# Patient Record
Sex: MALE | Race: WHITE | NOT HISPANIC OR LATINO
[De-identification: names, ages, dates, MRNs, and addresses within clinical notes are randomized per-mention and may not be internally consistent; named-entity substitution may affect disease eponyms.]

---

## 2017-02-28 ENCOUNTER — APPOINTMENT (OUTPATIENT)
Dept: PULMONOLOGY | Facility: CLINIC | Age: 67
End: 2017-02-28

## 2017-02-28 VITALS
HEART RATE: 76 BPM | SYSTOLIC BLOOD PRESSURE: 123 MMHG | HEIGHT: 65 IN | WEIGHT: 196 LBS | RESPIRATION RATE: 14 BRPM | DIASTOLIC BLOOD PRESSURE: 84 MMHG | BODY MASS INDEX: 32.65 KG/M2 | OXYGEN SATURATION: 95 %

## 2017-03-28 ENCOUNTER — RX RENEWAL (OUTPATIENT)
Age: 67
End: 2017-03-28

## 2017-03-30 ENCOUNTER — RX RENEWAL (OUTPATIENT)
Age: 67
End: 2017-03-30

## 2017-08-29 ENCOUNTER — APPOINTMENT (OUTPATIENT)
Dept: PULMONOLOGY | Facility: CLINIC | Age: 67
End: 2017-08-29
Payer: COMMERCIAL

## 2017-08-29 VITALS
HEIGHT: 65 IN | RESPIRATION RATE: 17 BRPM | DIASTOLIC BLOOD PRESSURE: 76 MMHG | SYSTOLIC BLOOD PRESSURE: 120 MMHG | HEART RATE: 88 BPM | WEIGHT: 200 LBS | BODY MASS INDEX: 33.32 KG/M2 | OXYGEN SATURATION: 98 %

## 2017-08-29 PROCEDURE — 94010 BREATHING CAPACITY TEST: CPT

## 2017-08-29 PROCEDURE — 99214 OFFICE O/P EST MOD 30 MIN: CPT | Mod: 25

## 2017-12-19 ENCOUNTER — RX RENEWAL (OUTPATIENT)
Age: 67
End: 2017-12-19

## 2017-12-28 ENCOUNTER — RX RENEWAL (OUTPATIENT)
Age: 67
End: 2017-12-28

## 2018-02-27 ENCOUNTER — APPOINTMENT (OUTPATIENT)
Dept: PULMONOLOGY | Facility: CLINIC | Age: 68
End: 2018-02-27
Payer: COMMERCIAL

## 2018-02-27 VITALS
OXYGEN SATURATION: 97 % | SYSTOLIC BLOOD PRESSURE: 124 MMHG | WEIGHT: 200 LBS | HEIGHT: 65 IN | BODY MASS INDEX: 33.32 KG/M2 | HEART RATE: 98 BPM | DIASTOLIC BLOOD PRESSURE: 74 MMHG

## 2018-02-27 PROCEDURE — 99214 OFFICE O/P EST MOD 30 MIN: CPT | Mod: 25

## 2018-02-27 PROCEDURE — 94010 BREATHING CAPACITY TEST: CPT

## 2018-07-03 ENCOUNTER — RX RENEWAL (OUTPATIENT)
Age: 68
End: 2018-07-03

## 2018-08-20 ENCOUNTER — RX RENEWAL (OUTPATIENT)
Age: 68
End: 2018-08-20

## 2018-09-04 ENCOUNTER — NON-APPOINTMENT (OUTPATIENT)
Age: 68
End: 2018-09-04

## 2018-09-04 ENCOUNTER — APPOINTMENT (OUTPATIENT)
Dept: PULMONOLOGY | Facility: CLINIC | Age: 68
End: 2018-09-04
Payer: COMMERCIAL

## 2018-09-04 VITALS
HEART RATE: 89 BPM | BODY MASS INDEX: 36.99 KG/M2 | WEIGHT: 222 LBS | OXYGEN SATURATION: 97 % | SYSTOLIC BLOOD PRESSURE: 130 MMHG | HEIGHT: 65 IN | DIASTOLIC BLOOD PRESSURE: 68 MMHG

## 2018-09-04 PROCEDURE — 99214 OFFICE O/P EST MOD 30 MIN: CPT | Mod: 25

## 2018-09-04 PROCEDURE — 94010 BREATHING CAPACITY TEST: CPT

## 2018-09-04 RX ORDER — ZOLPIDEM TARTRATE 10 MG/1
10 TABLET ORAL
Qty: 30 | Refills: 5 | Status: ACTIVE | COMMUNITY
Start: 2018-09-04 | End: 1900-01-01

## 2018-09-05 ENCOUNTER — TRANSCRIPTION ENCOUNTER (OUTPATIENT)
Age: 68
End: 2018-09-05

## 2019-02-18 ENCOUNTER — RX RENEWAL (OUTPATIENT)
Age: 69
End: 2019-02-18

## 2019-03-05 ENCOUNTER — NON-APPOINTMENT (OUTPATIENT)
Age: 69
End: 2019-03-05

## 2019-03-05 ENCOUNTER — APPOINTMENT (OUTPATIENT)
Dept: PULMONOLOGY | Facility: CLINIC | Age: 69
End: 2019-03-05
Payer: COMMERCIAL

## 2019-03-05 VITALS
OXYGEN SATURATION: 98 % | RESPIRATION RATE: 17 BRPM | HEART RATE: 75 BPM | WEIGHT: 220 LBS | HEIGHT: 65 IN | DIASTOLIC BLOOD PRESSURE: 80 MMHG | SYSTOLIC BLOOD PRESSURE: 120 MMHG | BODY MASS INDEX: 36.65 KG/M2

## 2019-03-05 PROCEDURE — 94010 BREATHING CAPACITY TEST: CPT

## 2019-03-05 PROCEDURE — 99214 OFFICE O/P EST MOD 30 MIN: CPT | Mod: 25

## 2019-03-05 RX ORDER — MIRTAZAPINE 15 MG/1
15 TABLET, FILM COATED ORAL
Qty: 30 | Refills: 0 | Status: ACTIVE | COMMUNITY
Start: 2018-11-28

## 2019-03-05 RX ORDER — ROSUVASTATIN CALCIUM 10 MG/1
10 TABLET, FILM COATED ORAL
Qty: 30 | Refills: 0 | Status: ACTIVE | COMMUNITY
Start: 2018-12-15

## 2019-03-05 RX ORDER — VALSARTAN AND HYDROCHLOROTHIAZIDE 160; 25 MG/1; MG/1
160-25 TABLET, FILM COATED ORAL
Qty: 30 | Refills: 0 | Status: ACTIVE | COMMUNITY
Start: 2018-12-24

## 2019-03-05 RX ORDER — ALPRAZOLAM 0.25 MG/1
0.25 TABLET ORAL
Qty: 90 | Refills: 0 | Status: ACTIVE | COMMUNITY
Start: 2019-02-23

## 2019-03-05 RX ORDER — CLONAZEPAM 1 MG/1
1 TABLET ORAL
Qty: 30 | Refills: 0 | Status: ACTIVE | COMMUNITY
Start: 2019-02-04

## 2019-03-05 NOTE — HISTORY OF PRESENT ILLNESS
[FreeTextEntry1] : Mr. Gusman is a 68 year old male presenting to the office today for a follow up visit for asthma, insomnia, and BUCK. His chief complaint is poor sleep\par -He states he is generally doing well. \par -He notes he is doing well with the CPAP and even brings it while traveling. \par -His weight has slightly increased. \par -He denies LE edema\par -He notes LE cramps, noticeable when he does not hydrate enough. \par -He shares he is not exercising enough, he notes he cannot find the time. \par -He denies hoarseness\par -He recently had a URI, now recovered. \par -He notes shortness of breath only upon exertion, denies shortness of breath while supine. \par -He denies any headaches, nausea, vomiting, fever, chills, sweats, chest pain, chest pressure, palpitations, wheezing, diarrhea, constipation, dysphagia, myalgias, dizziness, leg swelling, leg pain, itchy eyes, itchy ears, heartburn, reflux, or sour taste in the mouth.

## 2019-03-05 NOTE — ASSESSMENT
[FreeTextEntry1] : Mr. Gusman is a 68 years old male who has a history of poor sleep (OSAS), mild asthma and obesity, HTN, arthritis, stable from a pulmonary perspective. \par \par problem 1: insomnia (I-STOP renewed)\par - Continue Ambien 10 mg QHS on Weekdays \par - Continue Ambien CR 12.5 mg QHS and Xanax 0.5 mg QHS on Weekends. \par -failed Trazodone/ Silenor/ Belsomra\par \par problem 2: poor sleep hygiene \par -recommended to use sunglasses before bed \par -Good sleep hygiene was encouraged including avoiding watching television an hour before bed, keeping caffeine at a low,  avoiding reading, television, or anything, in bed, no drinking any liquids three hours before bedtime, and only getting into bed when tired and ready for sleep. \par \par problem 3: BUCK\par -continue to use the CPAP machine and tolerating it well (dream wear mask)\par -recommended to use Nozovent when unable to use CPAP\par -Discussed the risks/associations with coronary artery disease, atrial fibrillation, arrhythmia, memory loss, issues with concentration, stroke risk, hypertension, nocturia, chronic reflux/Segovia’s esophagus some but not all inclusive. Treatment options discussed including CPAP/BiPAP machine, oral appliance, ProVent therapy, Oxy-Aid by Respitec, new technologies, or positional sleep. \par \par problem 4: mild reactive airways disease (asthma)\par -no therapy indicated\par -quiet\par -Asthma is  believed to be caused by inherited (genetic) and environmental factor, but its exact cause is unknown. Asthma may be triggered by allergens, lung infections, or irritants in the air. Asthma triggers are different for each person \par \par problem 5: obesity\par - Recommended the book "Bright Spots and Land mines" by Yovani Torres\par -Weight loss, exercise, and diet control were discussed and are highly encouraged. Treatment options were given such as, aqua therapy, and contacting a nutritionist. Recommended to use the elliptical, stationary bike, less use of treadmill.  Obesity is associated with worsening asthma, shortness of breath, and potential for cardiac disease, diabetes, and other underlying medical conditions. \par \par problem 6: psychiatry\par -instructed to follow up with a new psychiatrist for Xanax\par \par problem 7: health maintenance \par -s/p influenza vaccination 2018\par -recommended strep pneumonia vaccines: Prevnar-13 vaccine, followed by Pneumo vaccine 23 one year following\par -recommended early intervention for URIs\par -recommended regular osteoporosis evaluations\par -recommended early dermatological evaluations\par -recommended after the age of 50 to the age of 70, colonoscopy every 5 years \par  \par \par F/U in 6 months\par He is encouraged to call with any changes, concerns, or questions.

## 2019-03-05 NOTE — PROCEDURE
[FreeTextEntry1] : PFT- spi reveals normal flows; FEV1 was 2.56 L which is 94% of predicted; normal flow volume loop\par \par 6 minute walk test reveals a low saturation of 98% with no evidence of dyspnea or fatigue; walked 586.8 meters\par  \par \par

## 2019-03-05 NOTE — ADDENDUM
[FreeTextEntry1] : Documented by Teri Sutton acting as a scribe for Dr. Flavio De La Cruz on 3/5/2019.\par \par All medical record entries made by the scribe, Teri Sutton, were at my, Dr. Flavio De La Cruz's, direction and personally dictated by me on 3/5/2019. I have reviewed the chart and agree that the record accurately reflects my personal performance of the history, physical exam, assessment and plan. I have also personally directed, reviewed, and agree with the discharge instructions.\par \par \par \par \par \par

## 2019-04-17 ENCOUNTER — RX RENEWAL (OUTPATIENT)
Age: 69
End: 2019-04-17

## 2019-04-18 ENCOUNTER — RX RENEWAL (OUTPATIENT)
Age: 69
End: 2019-04-18

## 2019-08-12 ENCOUNTER — RX RENEWAL (OUTPATIENT)
Age: 69
End: 2019-08-12

## 2019-08-12 RX ORDER — ZOLPIDEM TARTRATE 10 MG/1
10 TABLET ORAL
Qty: 30 | Refills: 5 | Status: ACTIVE | COMMUNITY
Start: 2019-02-18 | End: 1900-01-01

## 2019-09-10 ENCOUNTER — APPOINTMENT (OUTPATIENT)
Dept: PULMONOLOGY | Facility: CLINIC | Age: 69
End: 2019-09-10
Payer: COMMERCIAL

## 2019-09-10 ENCOUNTER — NON-APPOINTMENT (OUTPATIENT)
Age: 69
End: 2019-09-10

## 2019-09-10 VITALS
RESPIRATION RATE: 16 BRPM | HEIGHT: 65 IN | BODY MASS INDEX: 35.49 KG/M2 | DIASTOLIC BLOOD PRESSURE: 60 MMHG | HEART RATE: 75 BPM | WEIGHT: 213 LBS | SYSTOLIC BLOOD PRESSURE: 100 MMHG | OXYGEN SATURATION: 97 %

## 2019-09-10 PROCEDURE — 99214 OFFICE O/P EST MOD 30 MIN: CPT | Mod: 25

## 2019-09-10 PROCEDURE — 94010 BREATHING CAPACITY TEST: CPT

## 2019-09-10 NOTE — PROCEDURE
[FreeTextEntry1] : PFT- spi reveals normal flows; FEV1 was 2.88 L which is 106% of predicted; normal flow volume loop\par \par \par

## 2019-09-10 NOTE — ADDENDUM
[FreeTextEntry1] : All medical record entries made by ervin Mead were at Dr. Flavio De La Cruz's, direction and personally dictated by me on 09/10/2019. I have reviewed the chart and agree that the record accurately reflects my personal performance of the history, physical exam, assessment and plan. I have also personally directed, reviewed, and agree with the discharge instructions. \par \par \par \par \par

## 2019-09-10 NOTE — PHYSICAL EXAM
[General Appearance - Well Developed] : well developed [Well Groomed] : well groomed [Normal Appearance] : normal appearance [No Deformities] : no deformities [General Appearance - Well Nourished] : well nourished [General Appearance - In No Acute Distress] : no acute distress [Normal Conjunctiva] : the conjunctiva exhibited no abnormalities [Eyelids - No Xanthelasma] : the eyelids demonstrated no xanthelasmas [Normal Oropharynx] : normal oropharynx [III] : III [Neck Appearance] : the appearance of the neck was normal [Neck Cervical Mass (___cm)] : no neck mass was observed [Jugular Venous Distention Increased] : there was no jugular-venous distention [Thyroid Diffuse Enlargement] : the thyroid was not enlarged [Thyroid Nodule] : there were no palpable thyroid nodules [Heart Sounds] : normal S1 and S2 [Heart Rate And Rhythm] : heart rate and rhythm were normal [Murmurs] : no murmurs present [Respiration, Rhythm And Depth] : normal respiratory rhythm and effort [Auscultation Breath Sounds / Voice Sounds] : lungs were clear to auscultation bilaterally [Exaggerated Use Of Accessory Muscles For Inspiration] : no accessory muscle use [Abdomen Soft] : soft [Abdomen Tenderness] : non-tender [Abnormal Walk] : normal gait [Abdomen Mass (___ Cm)] : no abdominal mass palpated [Gait - Sufficient For Exercise Testing] : the gait was sufficient for exercise testing [Nail Clubbing] : no clubbing of the fingernails [Cyanosis, Localized] : no localized cyanosis [Petechial Hemorrhages (___cm)] : no petechial hemorrhages [] : no rash [Skin Color & Pigmentation] : normal skin color and pigmentation [No Venous Stasis] : no venous stasis [No Skin Ulcers] : no skin ulcer [Skin Lesions] : no skin lesions [No Xanthoma] : no  xanthoma was observed [Sensation] : the sensory exam was normal to light touch and pinprick [Deep Tendon Reflexes (DTR)] : deep tendon reflexes were 2+ and symmetric [No Focal Deficits] : no focal deficits [Oriented To Time, Place, And Person] : oriented to person, place, and time [Affect] : the affect was normal [Impaired Insight] : insight and judgment were intact [FreeTextEntry1] : I:E 1:3, clear

## 2019-09-10 NOTE — HISTORY OF PRESENT ILLNESS
[FreeTextEntry1] : Mr. Gusman is a 68 year old male presenting to the office today for a follow up visit for asthma, insomnia, and BUCK. His chief complaint is lack of exercise \par \par -Takes Xanax in order to sleep. Sleeps 5-6 hours. \par -Nocturia recently\par -Energy level is high.\par -Not exercising much, playing with grandkids. \par -Weight is stable lower than last time. \par -Vision, smell and taste are normal. \par \par -He denies any SOB, headaches, nausea, vomiting, fever, chills, sweats, chest pain, chest pressure, palpitations, wheezing, diarrhea, constipation, dysphagia, myalgias, dizziness, leg swelling, leg pain, itchy eyes, itchy ears, heartburn, reflux, or sour taste in the mouth.

## 2019-09-10 NOTE — ASSESSMENT
[FreeTextEntry1] : Mr. Gusman is a 68 years old male who has a history of poor sleep (OSAS), mild asthma and obesity, HTN, arthritis, stable from a pulmonary perspective. -#1 issue is lack of exercise\par \par problem 1: insomnia (I-STOP renewed)\par - Continue Ambien 10 mg QHS on Weekdays \par - Continue Ambien CR 12.5 mg QHS and Xanax 0.5 mg QHS on Weekends. \par -failed Trazodone/ Silenor/ Belsomra\par \par problem 2: poor sleep hygiene \par -recommended to use sunglasses before bed \par -Good sleep hygiene was encouraged including avoiding watching television an hour before bed, keeping caffeine at a low,  avoiding reading, television, or anything, in bed, no drinking any liquids three hours before bedtime, and only getting into bed when tired and ready for sleep. \par \par problem 3: BUCK\par -continue to use the CPAP machine and tolerating it well (dream wear mask)\par -recommended to use Nozovent when unable to use CPAP\par -Discussed the risks/associations with coronary artery disease, atrial fibrillation, arrhythmia, memory loss, issues with concentration, stroke risk, hypertension, nocturia, chronic reflux/Segovia’s esophagus some but not all inclusive. Treatment options discussed including CPAP/BiPAP machine, oral appliance, ProVent therapy, Oxy-Aid by Respitec, new technologies, or positional sleep. \par \par problem 4: mild reactive airways disease (asthma)\par -no therapy indicated\par -quiet\par -Asthma is  believed to be caused by inherited (genetic) and environmental factor, but its exact cause is unknown. Asthma may be triggered by allergens, lung infections, or irritants in the air. Asthma triggers are different for each person \par \par problem 5: obesity\par - Recommended the book "Bright Spots and Land mines" by Yovani Torres\par -Weight loss, exercise, and diet control were discussed and are highly encouraged. Treatment options were given such as, aqua therapy, and contacting a nutritionist. Recommended to use the elliptical, stationary bike, less use of treadmill.  Obesity is associated with worsening asthma, shortness of breath, and potential for cardiac disease, diabetes, and other underlying medical conditions. \par \par problem 6: psychiatry\par -instructed to follow up with a new psychiatrist for Xanax\par \par problem 7: health maintenance \par -s/p influenza vaccination 2018\par -recommended strep pneumonia vaccines: Prevnar-13 vaccine, followed by Pneumo vaccine 23 one year following\par -recommended early intervention for URIs\par -recommended regular osteoporosis evaluations\par -recommended early dermatological evaluations\par -recommended after the age of 50 to the age of 70, colonoscopy every 5 years \par  \par F/U in 6 months SPI\par He is encouraged to call with any changes, concerns, or questions.

## 2020-02-18 ENCOUNTER — RX RENEWAL (OUTPATIENT)
Age: 70
End: 2020-02-18

## 2020-03-10 ENCOUNTER — APPOINTMENT (OUTPATIENT)
Dept: PULMONOLOGY | Facility: CLINIC | Age: 70
End: 2020-03-10
Payer: COMMERCIAL

## 2020-06-06 ENCOUNTER — TRANSCRIPTION ENCOUNTER (OUTPATIENT)
Age: 70
End: 2020-06-06

## 2020-06-09 ENCOUNTER — APPOINTMENT (OUTPATIENT)
Dept: PULMONOLOGY | Facility: CLINIC | Age: 70
End: 2020-06-09
Payer: COMMERCIAL

## 2020-06-09 DIAGNOSIS — Z11.59 ENCOUNTER FOR SCREENING FOR OTHER VIRAL DISEASES: ICD-10-CM

## 2020-06-09 PROCEDURE — 99214 OFFICE O/P EST MOD 30 MIN: CPT | Mod: 95

## 2020-06-09 RX ORDER — DOXEPIN HYDROCHLORIDE 3 MG/1
3 TABLET ORAL
Qty: 30 | Refills: 5 | Status: DISCONTINUED | COMMUNITY
Start: 2018-02-27 | End: 2020-06-09

## 2020-06-09 RX ORDER — TRAZODONE HYDROCHLORIDE 50 MG/1
50 TABLET ORAL
Qty: 30 | Refills: 5 | Status: DISCONTINUED | COMMUNITY
Start: 2017-08-29 | End: 2020-06-09

## 2020-06-09 NOTE — HISTORY OF PRESENT ILLNESS
[Home] : at home, [unfilled] , at the time of the visit. [Medical Office: ([facility])___] : at the medical office located in  [Verbal consent obtained from patient] : the patient, [unfilled] [FreeTextEntry1] : Mr. Gusman is a 69 year old male presenting to the office today via video call for a follow up visit for asthma, insomnia, and BUCK. \par - He is working a lot, but enjoys being at home \par - He is planning on going to [employer] for a month \par - His sleep is okay \par - His ambien is losing some of its effect \par - He wakes up to use the rest room once\par - He lost 10 lbs \par - No coughing or wheezing \par - No palpitations \par - His energy level is good \par - He took a sleep test with Dr. Bhat but hasn’t gotten results back \par \par -he denies any headaches, nausea, vomiting, fever, chills, sweats, chest pain, chest pressure, diarrhea, constipation, dysphagia, dizziness, sour taste in the mouth, leg swelling, leg pain, itchy eyes, itchy ears, heartburn, reflux, myalgias or arthralgias.\par

## 2020-06-09 NOTE — ASSESSMENT
[FreeTextEntry1] : Mr. Gusman is a 69 years old male who has a history of poor sleep (OSAS), prediabetes, mild asthma and obesity, HTN, arthritis, stable from a pulmonary perspective. -#1 issue is lack of exercise and poor sleep. \par \par problem 1: insomnia (I-STOP renewed)\par - Continue Ambien 10 mg QHS on Weekdays \par - Continue Ambien CR 12.5 mg QHS and Xanax 0.5 mg QHS on Weekends. \par -failed Trazodone/ Silenor/ Belsomra\par \par problem 2: poor sleep hygiene \par -recommended to use sunglasses before bed \par -Good sleep hygiene was encouraged including avoiding watching television an hour before bed, keeping caffeine at a low,  avoiding reading, television, or anything, in bed, no drinking any liquids three hours before bedtime, and only getting into bed when tired and ready for sleep. \par \par problem 3: BUCK\par -continue to use the CPAP machine and tolerating it well (dream wear mask)\par - Restudy done by Israel Ponce\par -recommended to use Nozovent when unable to use CPAP\par -Discussed the risks/associations with coronary artery disease, atrial fibrillation, arrhythmia, memory loss, issues with concentration, stroke risk, hypertension, nocturia, chronic reflux/Segovia’s esophagus some but not all inclusive. Treatment options discussed including CPAP/BiPAP machine, oral appliance, ProVent therapy, Oxy-Aid by Respitec, new technologies, or positional sleep. \par \par problem 4: mild reactive airways disease (asthma)\par -no therapy indicated\par -quiet\par -Asthma is  believed to be caused by inherited (genetic) and environmental factor, but its exact cause is unknown. Asthma may be triggered by allergens, lung infections, or irritants in the air. Asthma triggers are different for each person \par \par problem 5: obesity\par - Recommended the book "Bright Spots and Land mines" by Yovani Torres\par -Weight loss, exercise, and diet control were discussed and are highly encouraged. Treatment options were given such as, aqua therapy, and contacting a nutritionist. Recommended to use the elliptical, stationary bike, less use of treadmill.  Obesity is associated with worsening asthma, shortness of breath, and potential for cardiac disease, diabetes, and other underlying medical conditions. \par \par problem 6: psychiatry\par -instructed to follow up with a new psychiatrist for Xanax\par \par Problem 7a: COVID-19 precautionary Immune Support Recommendations:\par -OTC Vitamin C 500mg BID \par -OTC Quercetin 250-500mg BID \par -OTC Zinc 75-100mg per day \par -OTC Melatonin 1 or 2mg a night \par -OTC Vitamin D 1-4000mg per day \par -OTC Tonic Water 8oz per day\par -Water 0.5-1 gallon per day\par \par problem 7: health maintenance \par -s/p influenza vaccination 2019\par -recommended strep pneumonia vaccines: Prevnar-13 vaccine, followed by Pneumo vaccine 23 one year following\par -recommended early intervention for URIs\par -recommended regular osteoporosis evaluations\par -recommended early dermatological evaluations\par -recommended after the age of 50 to the age of 70, colonoscopy every 5 years \par  \par F/U in 6 months SPI\par He is encouraged to call with any changes, concerns, or questions.

## 2020-10-23 ENCOUNTER — RX RENEWAL (OUTPATIENT)
Age: 70
End: 2020-10-23

## 2020-10-26 ENCOUNTER — RX RENEWAL (OUTPATIENT)
Age: 70
End: 2020-10-26

## 2020-10-26 RX ORDER — ZOLPIDEM TARTRATE 10 MG/1
10 TABLET ORAL
Qty: 30 | Refills: 5 | Status: ACTIVE | COMMUNITY
Start: 2019-09-10 | End: 1900-01-01

## 2020-10-27 ENCOUNTER — APPOINTMENT (OUTPATIENT)
Dept: PULMONOLOGY | Facility: CLINIC | Age: 70
End: 2020-10-27
Payer: COMMERCIAL

## 2020-10-27 VITALS
TEMPERATURE: 97.6 F | BODY MASS INDEX: 33.82 KG/M2 | RESPIRATION RATE: 16 BRPM | DIASTOLIC BLOOD PRESSURE: 65 MMHG | OXYGEN SATURATION: 98 % | HEIGHT: 65 IN | WEIGHT: 203 LBS | SYSTOLIC BLOOD PRESSURE: 120 MMHG | HEART RATE: 66 BPM

## 2020-10-27 PROCEDURE — 99072 ADDL SUPL MATRL&STAF TM PHE: CPT

## 2020-10-27 PROCEDURE — 99214 OFFICE O/P EST MOD 30 MIN: CPT

## 2020-10-27 RX ORDER — METFORMIN ER 500 MG 500 MG/1
500 TABLET ORAL
Qty: 30 | Refills: 0 | Status: ACTIVE | COMMUNITY
Start: 2020-05-07

## 2020-10-27 RX ORDER — TRIAMCINOLONE ACETONIDE 1 MG/G
0.1 CREAM TOPICAL
Qty: 80 | Refills: 0 | Status: ACTIVE | COMMUNITY
Start: 2020-10-08

## 2020-10-27 NOTE — HISTORY OF PRESENT ILLNESS
[FreeTextEntry1] : Mr. Gusman is a 70 year old male presenting to the office today for a follow up visit for asthma, insomnia, and BUCK. His chief compliant is weight\par \par *forms filled out for DMV\par -he notes generally feeling well\par -he notes sleep improved with Medical marijuana\par -he denies loss of consciousness episodes\par -he notes returning to exercise cycling\par -he notes weight intentionally decreased\par -he notes diet improved\par - He  notes nightly compliance with CPAP, tolerating well, and benefitting from therapy \par \par -denies any chest pain, chest pressure, diarrhea, constipation, dysphagia, dizziness, leg swelling, leg pain, myalgias, arthralgias, itchy eyes, itchy ears, heartburn, reflux, or sour taste in the mouth.\par \par

## 2020-10-27 NOTE — PROCEDURE
[FreeTextEntry1] : Sleep study (7.6.2020) revealed sleep apnea with an AHI/SHARA of 3.4 and a low oxygen saturation of 86%\par \par

## 2020-10-27 NOTE — ASSESSMENT
[FreeTextEntry1] : Mr. Gusman is a 70 years old male who has a history of poor sleep (OSAS), prediabetes, mild asthma and obesity, HTN, arthritis, stable from a pulmonary perspective. -#1 issue is lack of exercise and #2 obesity. \par ****forms filled out for DMV*****\par \par problem 1: insomnia (I-STOP renewed)\par - Continue Ambien 10 mg QHS on Weekdays \par - Continue Ambien CR 12.5 mg QHS and Xanax 0.5 mg QHS on Weekends. \par -failed Trazodone/ Silenor/ Belsomra\par \par Problem 1A: DMV forms \par -filled out\par \par problem 2: poor sleep hygiene \par -recommended to use sunglasses before bed \par -Good sleep hygiene was encouraged including avoiding watching television an hour before bed, keeping caffeine at a low,  avoiding reading, television, or anything, in bed, no drinking any liquids three hours before bedtime, and only getting into bed when tired and ready for sleep. \par \par problem 3: BUCK\par -continue to use the CPAP machine and tolerating it well (dream wear mask) ([employer]) \par - Restudy done by Israel Ponce\par -recommended to use Nozovent when unable to use CPAP\par -Discussed the risks/associations with coronary artery disease, atrial fibrillation, arrhythmia, memory loss, issues with concentration, stroke risk, hypertension, nocturia, chronic reflux/Segovia’s esophagus some but not all inclusive. Treatment options discussed including CPAP/BiPAP machine, oral appliance, ProVent therapy, Oxy-Aid by Respitec, new technologies, or positional sleep. \par \par problem 4: mild reactive airways disease (asthma)\par -no therapy indicated\par -quiet\par -Asthma is  believed to be caused by inherited (genetic) and environmental factor, but its exact cause is unknown. Asthma may be triggered by allergens, lung infections, or irritants in the air. Asthma triggers are different for each person \par \par problem 5: obesity\par -complete Functional Medicine Evaluation with Dr. Walker and Dr. Wells\par - Recommended the book "Bright Spots and Land mines" by Yovani Torres\par -Weight loss, exercise, and diet control were discussed and are highly encouraged. Treatment options were given such as, aqua therapy, and contacting a nutritionist. Recommended to use the elliptical, stationary bike, less use of treadmill.  Obesity is associated with worsening asthma, shortness of breath, and potential for cardiac disease, diabetes, and other underlying medical conditions. \par \par problem 6: psychiatry\par -instructed to follow up with a new psychiatrist for Xanax\par \par Problem 7a: COVID-19 precautionary Immune Support Recommendations:\par -OTC Vitamin C 500mg BID \par -OTC Quercetin 250-500mg BID \par -OTC Zinc 75-100mg per day \par -OTC Melatonin 1 or 2mg a night \par -OTC Vitamin D 1-4000mg per day \par -OTC Tonic Water 8oz per day\par -Water 0.5-1 gallon per day\par \par problem 7: health maintenance \par -s/p influenza vaccination 2020\par -recommended strep pneumonia vaccines: Prevnar-13 vaccine, followed by Pneumo vaccine 23 one year following (completed) \par -recommended early intervention for URIs\par -recommended regular osteoporosis evaluations\par -recommended early dermatological evaluations\par -recommended after the age of 50 to the age of 70, colonoscopy every 5 years \par  \par F/U in 6 months SPI\par He is encouraged to call with any changes, concerns, or questions.

## 2020-10-27 NOTE — ADDENDUM
[FreeTextEntry1] : \par  Documented by Amos Mcrae acting as a scribe for Dr. Flavio De La Cruz on 10/27/2020.\par \par All medical record entries made by the Scribe were at my, Dr. Flavio De La Cruz's, direction and personally dictated by me on 10/27/2020 . I have reviewed the chart and agree that the record accurately reflects my personal performance of the history, physical exam, assessment and plan. I have also personally directed, reviewed, and agree with the discharge instructions. \par

## 2021-06-07 ENCOUNTER — TRANSCRIPTION ENCOUNTER (OUTPATIENT)
Age: 71
End: 2021-06-07

## 2021-06-08 ENCOUNTER — APPOINTMENT (OUTPATIENT)
Dept: PULMONOLOGY | Facility: CLINIC | Age: 71
End: 2021-06-08
Payer: COMMERCIAL

## 2021-06-08 VITALS
DIASTOLIC BLOOD PRESSURE: 70 MMHG | SYSTOLIC BLOOD PRESSURE: 120 MMHG | BODY MASS INDEX: 36.14 KG/M2 | OXYGEN SATURATION: 95 % | TEMPERATURE: 97.4 F | HEART RATE: 72 BPM | HEIGHT: 63 IN | WEIGHT: 204 LBS | RESPIRATION RATE: 16 BRPM

## 2021-06-08 PROCEDURE — 99214 OFFICE O/P EST MOD 30 MIN: CPT | Mod: 25

## 2021-06-08 PROCEDURE — 94729 DIFFUSING CAPACITY: CPT

## 2021-06-08 PROCEDURE — 99072 ADDL SUPL MATRL&STAF TM PHE: CPT

## 2021-06-08 PROCEDURE — 94727 GAS DIL/WSHOT DETER LNG VOL: CPT

## 2021-06-08 PROCEDURE — 94010 BREATHING CAPACITY TEST: CPT

## 2021-06-08 PROCEDURE — 95012 NITRIC OXIDE EXP GAS DETER: CPT

## 2021-06-08 PROCEDURE — ZZZZZ: CPT

## 2021-06-08 NOTE — REVIEW OF SYSTEMS
[Negative] : Endocrine [Arthralgias] : arthralgias [Dyspnea] : no dyspnea [SOB on Exertion] : no sob on exertion [Chest Discomfort] : no chest discomfort [GERD] : no gerd [Diarrhea] : no diarrhea [Constipation] : no constipation [Dysphagia] : no dysphagia

## 2021-06-08 NOTE — ADDENDUM
[FreeTextEntry1] : Documented by Wes Lazcano acting as a scribe for Dr. Flavio De La Cruz on 06/08/2021.\par \par All medical record entries made by the Scribe were at my, Dr. Flavio De La Cruz's, direction and personally dictated by me on 06/08/2021. I have reviewed the chart and agree that the record accurately reflects my personal performance of the history, physical exam, assessment and plan. I have also personally directed, reviewed, and agree with the discharge instructions.

## 2021-06-08 NOTE — PROCEDURE
[FreeTextEntry1] : Full PFT revealed normal flows, with a FEV1 of 2.92L, which is 121% of predicted, normal lung volumes, and a normal diffusion of 23.8, which is 115% of predicted, with a normal flow volume loop \par \par FENO was 57; a normal value being less than 25\par Fractional exhaled nitric oxide (FENO) is regarded as a simple, noninvasive method for assessing eosinophilic airway inflammation. Produced by a variety of cells within the lung, nitric oxide (NO) concentrations are generally low in healthy individuals. However, high concentrations of NO appear to be involved in nonspecific host defense mechanisms and chronic inflammatory diseases such as asthma. The American Thoracic Society (ATS) therefore has recommended using FENO to aid in the diagnosis and monitoring of eosinophilic airway inflammation and asthma, and for identifying steroid responsive individuals whose chronic respiratory symptoms may be caused by airway inflammation. \par

## 2021-06-08 NOTE — HISTORY OF PRESENT ILLNESS
[FreeTextEntry1] : Mr. Gusman is a 70 year old male presenting to the office today for a follow up visit for asthma, insomnia, and BUCK. His chief compliant is neck pains\par -he reports feeling generally well\par -he states he feels greatly improved since working at home (no commute)\par -he states he is exercising at home, but not enough\par -he reports he has occasional headaches from neck positioning\par -he notes his weight is stable\par -he reports he has an exercise bike at home\par -he states he has a Respironics CPAP machine, and needs to get a Respironics mask as well (instead of Resmed)\par -he states he has a pinched nerve and arthritis in his back, left leg\par -he is s/p the Covid-19 vaccine\par -he states he sleeps 6 hours at night, but it isnt enough for him\par -he denies any SOB, chest pain, chest pressure, diarrhea, constipation, dysphagia, dizziness, sour taste in the mouth, heartburn, reflux

## 2021-08-26 ENCOUNTER — RX RENEWAL (OUTPATIENT)
Age: 71
End: 2021-08-26

## 2021-10-30 ENCOUNTER — RX RENEWAL (OUTPATIENT)
Age: 71
End: 2021-10-30

## 2021-11-01 ENCOUNTER — RX RENEWAL (OUTPATIENT)
Age: 71
End: 2021-11-01

## 2021-11-11 ENCOUNTER — APPOINTMENT (OUTPATIENT)
Dept: PULMONOLOGY | Facility: CLINIC | Age: 71
End: 2021-11-11
Payer: COMMERCIAL

## 2021-11-11 ENCOUNTER — NON-APPOINTMENT (OUTPATIENT)
Age: 71
End: 2021-11-11

## 2021-11-11 ENCOUNTER — TRANSCRIPTION ENCOUNTER (OUTPATIENT)
Age: 71
End: 2021-11-11

## 2021-11-11 VITALS
OXYGEN SATURATION: 95 % | SYSTOLIC BLOOD PRESSURE: 140 MMHG | TEMPERATURE: 96.9 F | BODY MASS INDEX: 35.44 KG/M2 | HEART RATE: 72 BPM | WEIGHT: 200 LBS | HEIGHT: 63 IN | DIASTOLIC BLOOD PRESSURE: 80 MMHG

## 2021-11-11 PROCEDURE — 99214 OFFICE O/P EST MOD 30 MIN: CPT | Mod: 25

## 2021-11-11 PROCEDURE — 94010 BREATHING CAPACITY TEST: CPT

## 2021-11-11 PROCEDURE — 95012 NITRIC OXIDE EXP GAS DETER: CPT

## 2021-11-11 RX ORDER — ZOLPIDEM TARTRATE 10 MG/1
10 TABLET ORAL
Qty: 30 | Refills: 5 | Status: ACTIVE | COMMUNITY
Start: 2019-04-17 | End: 1900-01-01

## 2021-11-11 RX ORDER — ZOLPIDEM TARTRATE 12.5 MG/1
12.5 TABLET, EXTENDED RELEASE ORAL
Qty: 30 | Refills: 5 | Status: ACTIVE | COMMUNITY
Start: 2019-04-17 | End: 1900-01-01

## 2021-11-11 NOTE — HISTORY OF PRESENT ILLNESS
[FreeTextEntry1] : Mr. Gusman is a 71 year old male presenting to the office today for a follow up visit for asthma, insomnia, and BUCK. His chief compliant is neck pains\par -he notes feeling alright \par -he notes being a bit agitated \par -he notes not exercising much \par -he denies diarrhea and constipation \par -he denies sour taste in mouth \par -he denies visual issues \par -he denies getting enough sleep but makes it up on weekends \par -he notes trouble getting to and staying sleep \par -he notes taking xanax to help sleep \par -he notes not being able to get over 6 hours of sleep \par -he notes getting his booster shot \par -he notes Dr. Ponce's was giving him the Xanax \par -he notes using the CPAP machine \par patient denies any headaches, nausea, vomiting, fever, chills, sweats, chest pain, chest pressure, palpitations, coughing, wheezing, fatigue, diarrhea, constipation, dysphagia, myalgias, dizziness, leg swelling, leg pain, itchy eyes, itchy ears, heartburn, reflux or sour taste in the mouth

## 2021-11-11 NOTE — PROCEDURE
[FreeTextEntry1] : PFT revealed normal flows, with a FEV1 of 2.85L, which is 120% of predicted, with a normal flow volume loop\par \par Feno was 19; a normal value being less than 25. Fractional exhaled nitric oxide (FENO) is regarded as a simple, noninvasive method for assessing eosinophilic airway inflammation. Produced by a variety of cells within the lung, nitric oxide (NO) concentrations are generally low in healthy individuals. However, high concentrations of NO appear to be involved in nonspecific host defense mechanisms and chronic inflammatory  diseases such as asthma. The American Thoracic Society (ATS) therefore recommended using FENO to aid in the diagnosis and monitoring of eosinophilic airway inflammation and asthma, and for identifying steroid responsive individuals whose chronic respiratory symptoms may be caused by airway inflammation \par

## 2021-11-11 NOTE — ASSESSMENT
[FreeTextEntry1] : Mr. Gusman is a 71 years old male who has a history of poor sleep (OSAS), prediabetes, mild asthma and obesity, HTN, arthritis, stable from a pulmonary perspective. -#1 issue is lack of exercise and #2 obesity still; s/p Covid-19 vaccine \par ****forms filled out for DMV***** 11/11/2021\par \par problem 1: insomnia (I-STOP renewed)\par - Continue Ambien 10 mg QHS on Weekdays \par - Continue Ambien CR 12.5 mg QHS and Xanax 0.5 mg QHS on Weekends. \par -failed Trazodone/ Silenor/ Belsomra; Try "Perrigo" \par \par Problem 1A: DMV forms \par -filled out 11/11/2021\par \par problem 2: poor sleep hygiene \par -recommended to use sunglasses before bed \par -Good sleep hygiene was encouraged including avoiding watching television an hour before bed, keeping caffeine at a low,  avoiding reading, television, or anything, in bed, no drinking any liquids three hours before bedtime, and only getting into bed when tired and ready for sleep. \par \par problem 3: BUCK\par -Recommended OTC sleep therapies such as Oxyaid/ NasalAid, Somnifix, Provent, Nozovent, Sleep Rite, Slumber Bump, Total Pillow, and AVEOTSD \par -continue to use the CPAP machine and tolerating it well (dream wear mask) ([employer]) (again)\par - Restudy done by Israel Ponce\par -recommended to use Nozovent when unable to use CPAP\par -Discussed the risks/associations with coronary artery disease, atrial fibrillation, arrhythmia, memory loss, issues with concentration, stroke risk, hypertension, nocturia, chronic reflux/Segovia’s esophagus some but not all inclusive. Treatment options discussed including CPAP/BiPAP machine, oral appliance, ProVent therapy, Oxy-Aid by [employer], new technologies, or positional sleep. \par \par problem 4: mild reactive airways disease (asthma)\par -no therapy indicated\par -quiet\par -Asthma is  believed to be caused by inherited (genetic) and environmental factor, but its exact cause is unknown. Asthma may be triggered by allergens, lung infections, or irritants in the air. Asthma triggers are different for each person \par \par problem 5: obesity\par -Recommend "Muniq" OTC Supplement for weight loss, energy levels, and blood sugar levels \par -complete Functional Medicine Evaluation with Dr. Walker and Dr. Wells\par - Recommended the book "Bright Spots and Land mines" by Yovani Torres\par -Weight loss, exercise, and diet control were discussed and are highly encouraged. Treatment options were given such as, aqua therapy, and contacting a nutritionist. Recommended to use the elliptical, stationary bike, less use of treadmill.  Obesity is associated with worsening asthma, shortness of breath, and potential for cardiac disease, diabetes, and other underlying medical conditions. \par \par problem 6: psychiatry\par -instructed to follow up with Dr. Ponce for Xanax\par \par Problem 7a: COVID-19 precautionary Immune Support Recommendations:\par -OTC Vitamin C 500mg BID \par -OTC Quercetin 250-500mg BID \par -OTC Zinc 75-100mg per day \par -OTC Melatonin 1 or 2mg a night \par -OTC Vitamin D 1-4000mg per day \par -OTC Tonic Water 8oz per day\par -Water 0.5-1 gallon per day\par \par problem 7: health maintenance \par -Recommended Dr. Castano \par -s/p influenza vaccination 2020\par -recommended strep pneumonia vaccines: Prevnar-13 vaccine, followed by Pneumo vaccine 23 one year following (completed) \par -recommended early intervention for URIs\par -recommended regular osteoporosis evaluations\par -recommended early dermatological evaluations\par -recommended after the age of 50 to the age of 70, colonoscopy every 5 years \par  \par F/U in 6 months SPI\par He is encouraged to call with any changes, concerns, or questions.

## 2021-11-11 NOTE — REVIEW OF SYSTEMS
[Arthralgias] : arthralgias [Negative] : Endocrine [Dyspnea] : no dyspnea [SOB on Exertion] : no sob on exertion [Chest Discomfort] : no chest discomfort [GERD] : no gerd [Diarrhea] : no diarrhea [Constipation] : no constipation [Dysphagia] : no dysphagia

## 2021-11-11 NOTE — ADDENDUM
[FreeTextEntry1] : Documented by Jeanne Galeano acting as a scribe for Dr. Flavio De La Cruz on (11/11/2021).\par \par All medical record entries made by the Scribe were at my, Dr. Flavio De La Cruz's, direction and personally dictated by me on (11/11/2021). I have reviewed the chart and agree that the record accurately reflects my personal performance of the history, physical exam, assessment and plan. I have also personally directed, reviewed, and agree with the discharge instructions.\par

## 2022-03-06 ENCOUNTER — RX RENEWAL (OUTPATIENT)
Age: 72
End: 2022-03-06

## 2022-03-07 ENCOUNTER — RX RENEWAL (OUTPATIENT)
Age: 72
End: 2022-03-07

## 2022-04-11 PROBLEM — Z11.59 SCREENING FOR VIRAL DISEASE: Status: ACTIVE | Noted: 2020-06-11

## 2022-05-17 ENCOUNTER — APPOINTMENT (OUTPATIENT)
Dept: PULMONOLOGY | Facility: CLINIC | Age: 72
End: 2022-05-17
Payer: COMMERCIAL

## 2022-05-17 ENCOUNTER — NON-APPOINTMENT (OUTPATIENT)
Age: 72
End: 2022-05-17

## 2022-05-17 VITALS
RESPIRATION RATE: 16 BRPM | SYSTOLIC BLOOD PRESSURE: 130 MMHG | DIASTOLIC BLOOD PRESSURE: 72 MMHG | TEMPERATURE: 97.3 F | OXYGEN SATURATION: 98 % | WEIGHT: 200 LBS | HEART RATE: 64 BPM | HEIGHT: 64 IN | BODY MASS INDEX: 34.15 KG/M2

## 2022-05-17 PROCEDURE — 95012 NITRIC OXIDE EXP GAS DETER: CPT

## 2022-05-17 PROCEDURE — 99214 OFFICE O/P EST MOD 30 MIN: CPT | Mod: 25

## 2022-05-17 PROCEDURE — 94010 BREATHING CAPACITY TEST: CPT

## 2022-05-17 NOTE — ADDENDUM
[FreeTextEntry1] : Documented by Mukesh Mccormick acting as a scribe for Dr. Flavio De La Cruz on 05/17/2022.\par \par All medical record entries made by the Scribe were at my, Dr. Flavio De La Cruz's, direction and personally dictated by me on 05/17/2022. I have reviewed the chart and agree that the record accurately reflects my personal performance of the history, physical exam, assessment and plan. I have also personally directed, reviewed, and agree with the discharge instructions.

## 2022-05-17 NOTE — PROCEDURE
[FreeTextEntry1] : PFT revealed normal flows, with a FEV1 of 2.75L, which is 110% of predicted, with a normal flow volume loop \par \par Feno was 51; a normal value being less than 25. Fractional exhaled nitric oxide (FENO) is regarded as a simple, noninvasive method for assessing eosinophilic airway inflammation. Produced by a variety of cells within the lung, nitric oxide (NO) concentrations are generally low in healthy individuals. However, high concentrations of NO appear to be involved in nonspecific host defense mechanisms and chronic inflammatory  diseases such as asthma. The American Thoracic Society (ATS) therefore recommended using FENO to aid in the diagnosis and monitoring of eosinophilic airway inflammation and asthma, and for identifying steroid responsive individuals whose chronic respiratory symptoms may be caused by airway inflammation

## 2022-05-17 NOTE — HISTORY OF PRESENT ILLNESS
[FreeTextEntry1] : Mr. Gusman is a 71 year old male presenting to the office today for a follow up visit for asthma, insomnia, and BUCK. His chief compliant is neck pains\par -he notes feeling fine generally\par -he notes his sleep may have gotten better (6 hours, 7-8 hours on weekends)\par -he notes his bowels are regular \par -he notes his sense of smell and taste are normal \par -he notes taking melatonin (5 mg)\par -he notes taking Xanax every other night\par -he notes he is exercising a little\par -s/p covid 19 vaccine x4\par -he notes he has been using his CPAP\par -he is unsure if he still needs his CPAP \par \par -patient denies any headaches, nausea, vomiting, fever, chills, sweats, chest pain, chest pressure, palpitations, coughing, wheezing, fatigue, diarrhea, constipation, dysphagia, myalgias, dizziness, leg swelling, leg pain, itchy eyes, itchy ears, heartburn, reflux or sour taste in the mouth

## 2022-05-17 NOTE — ASSESSMENT
[FreeTextEntry1] : Mr. Gusman is a 71 years old male who has a history of poor sleep (OSAS), prediabetes, mild asthma and obesity, HTN, arthritis, stable from a pulmonary perspective. -#1 issue is lack of exercise and #2 obesity still; s/p Covid-19 vaccine - improved sleep\par \par ****forms filled out for DMV***** 11/11/2021\par \par problem 1: insomnia (I-STOP renewed)\par -continue melatonin u to 15 mg qHS\par - Continue Ambien 10 mg QHS on Weekdays \par - Continue Ambien CR 12.5 mg QHS and Xanax 0.5 mg QHS on Weekends. \par -failed Trazodone/ Silenor/ Belsomra; Try "Perrigo" \par \par \par Problem 1A: DMV forms \par -filled out 11/11/2021\par \par problem 2: poor sleep hygiene \par -recommended to use sunglasses before bed \par -Good sleep hygiene was encouraged including avoiding watching television an hour before bed, keeping caffeine at a low,  avoiding reading, television, or anything, in bed, no drinking any liquids three hours before bedtime, and only getting into bed when tired and ready for sleep. \par \par problem 3: BUCK\par -Recommended OTC sleep therapies such as Oxyaid/ NasalAid, Somnifix, Provent, Nozovent, Sleep Rite, Slumber Bump, Total Pillow, and AVEOTSD \par -continue to use the CPAP machine and tolerating it well (dream wear mask) ([employer]) (again)\par - Restudy done by Israel Ponce\par -Discussed the risks/associations with coronary artery disease, atrial fibrillation, arrhythmia, memory loss, issues with concentration, stroke risk, hypertension, nocturia, chronic reflux/Segovia’s esophagus some but not all inclusive. Treatment options discussed including CPAP/BiPAP machine, oral appliance, ProVent therapy, Oxy-Aid by [employer], new technologies, or positional sleep. \par \par problem 4: mild reactive airways disease (asthma)\par -no therapy indicated\par -quiet\par -Asthma is  believed to be caused by inherited (genetic) and environmental factor, but its exact cause is unknown. Asthma may be triggered by allergens, lung infections, or irritants in the air. Asthma triggers are different for each person \par \par problem 5: obesity\par -Recommend "Muniq" OTC Supplement for weight loss, energy levels, and blood sugar levels \par -complete Functional Medicine Evaluation with Dr. Walker and Dr. Wells\par - Recommended the book "Bright Spots and Land mines" by Yovani Torres\par -Weight loss, exercise, and diet control were discussed and are highly encouraged. Treatment options were given such as, aqua therapy, and contacting a nutritionist. Recommended to use the elliptical, stationary bike, less use of treadmill.  Obesity is associated with worsening asthma, shortness of breath, and potential for cardiac disease, diabetes, and other underlying medical conditions. \par \par problem 6: psychiatry\par -instructed to follow up with Dr. Ponce for Xanax\par \par Problem 7a: COVID-19 precautionary Immune Support Recommendations:\par -s/p covid 19 vaccine x3 \par -OTC Vitamin C 500mg BID \par -OTC Quercetin 250-500mg BID \par -OTC Zinc 75-100mg per day \par -OTC Melatonin 1 or 2mg a night \par -OTC Vitamin D 1-4000mg per day \par -OTC Tonic Water 8oz per day\par -Water 0.5-1 gallon per day\par \par problem 7: health maintenance \par -Recommended Seb Lester's 10-day detox diet and book. \par -Recommended Dr. Castano \par -s/p influenza vaccination 2020\par -recommended strep pneumonia vaccines: Prevnar-13 vaccine, followed by Pneumo vaccine 23 one year following (completed) \par -recommended early intervention for URIs\par -recommended regular osteoporosis evaluations\par -recommended early dermatological evaluations\par -recommended after the age of 50 to the age of 70, colonoscopy every 5 years \par  \par F/U in 6 months SPI\par He is encouraged to call with any changes, concerns, or questions.

## 2022-08-18 ENCOUNTER — RX RENEWAL (OUTPATIENT)
Age: 72
End: 2022-08-18

## 2022-11-22 ENCOUNTER — APPOINTMENT (OUTPATIENT)
Dept: PULMONOLOGY | Facility: CLINIC | Age: 72
End: 2022-11-22

## 2022-11-22 ENCOUNTER — NON-APPOINTMENT (OUTPATIENT)
Age: 72
End: 2022-11-22

## 2022-11-22 VITALS
SYSTOLIC BLOOD PRESSURE: 122 MMHG | HEIGHT: 64 IN | OXYGEN SATURATION: 98 % | TEMPERATURE: 96.9 F | BODY MASS INDEX: 34.15 KG/M2 | RESPIRATION RATE: 16 BRPM | WEIGHT: 200 LBS | HEART RATE: 78 BPM | DIASTOLIC BLOOD PRESSURE: 78 MMHG

## 2022-11-22 DIAGNOSIS — Z71.89 OTHER SPECIFIED COUNSELING: ICD-10-CM

## 2022-11-22 DIAGNOSIS — U07.1 COVID-19: ICD-10-CM

## 2022-11-22 PROCEDURE — 99214 OFFICE O/P EST MOD 30 MIN: CPT | Mod: 25

## 2022-11-22 PROCEDURE — 94010 BREATHING CAPACITY TEST: CPT

## 2022-11-22 PROCEDURE — 95012 NITRIC OXIDE EXP GAS DETER: CPT

## 2022-11-22 RX ORDER — AMMONIUM LACTATE 12 %
12 CREAM (GRAM) TOPICAL
Qty: 140 | Refills: 0 | Status: DISCONTINUED | COMMUNITY
Start: 2022-06-02

## 2022-11-22 RX ORDER — ALCLOMETASONE DIPROPIONATE 0.5 MG/G
0.05 CREAM TOPICAL
Qty: 60 | Refills: 0 | Status: DISCONTINUED | COMMUNITY
Start: 2022-10-17

## 2022-11-22 RX ORDER — NIRMATRELVIR AND RITONAVIR 300-100 MG
20 X 150 MG & KIT ORAL
Qty: 30 | Refills: 0 | Status: DISCONTINUED | COMMUNITY
Start: 2022-06-29

## 2022-11-22 RX ORDER — EMPAGLIFLOZIN 10 MG/1
10 TABLET, FILM COATED ORAL
Qty: 30 | Refills: 0 | Status: ACTIVE | COMMUNITY
Start: 2022-08-13

## 2022-11-22 RX ORDER — SULFAMETHOXAZOLE AND TRIMETHOPRIM 800; 160 MG/1; MG/1
800-160 TABLET ORAL
Qty: 28 | Refills: 0 | Status: DISCONTINUED | COMMUNITY
Start: 2022-11-09

## 2022-11-22 RX ORDER — ZOLPIDEM TARTRATE 12.5 MG/1
12.5 TABLET, EXTENDED RELEASE ORAL
Qty: 30 | Refills: 5 | Status: ACTIVE | COMMUNITY
Start: 2018-09-04 | End: 1900-01-01

## 2022-11-22 NOTE — HISTORY OF PRESENT ILLNESS
[FreeTextEntry1] : Mr. Gusman is a 72 year old male presenting to the office today for a follow up visit for asthma, insomnia, and BUCK. His chief compliant is \par \par -he notes energy levels are good \par -he notes leg pain due to sciatica\par -he notes on Abx due to prostatitis\par -he notes frequent urination due to prostatitis\par -he notes he will see Dr. Nova (urology)\par -he notes sinuses are quiet \par -he notes weight is stable \par -he notes good quality of sleep even without CPAP\par -he notes non compliant with CPAP due to no improvement in condition with CPAP\par -s/p COVID-19 6/2022 s/p Paxlovid\par -he notes feeling fully recovered from COVID-19\par \par -he denies any headaches, nausea, vomiting, chills, sweats, chest pain, chest pressure, coughing, wheezing, palpitations, constipation, diarrhea, dizziness, dysphagia, heartburn, reflux, itchy eyes, itchy ears, leg swelling, arthralgias, myalgias, or sour taste in the mouth.

## 2022-11-22 NOTE — ASSESSMENT
[FreeTextEntry1] : Mr. Gusman is a 72 years old male who has a history of COVID-19 6/2022, poor sleep (OSAS), prediabetes, mild asthma and obesity, HTN, arthritis, stable from a pulmonary perspective. -#1 issue is lack of exercise and #2 obesity still; s/p Covid-19 vaccine - improved sleep\par \par ****forms filled out for DMV***** 11/11/2021\par \par problem 1: insomnia (I-STOP renewed)\par -continue melatonin u to 15 mg qHS\par - Continue Ambien 10 mg QHS on Weekdays \par - Continue Ambien CR 12.5 mg QHS and Xanax 0.5 mg QHS on Weekends. \par -failed Trazodone/ Silenor/ Belsomra; Try "Perrigo" \par \par \par Problem 1A: DMV forms \par -filled out 11/11/2021\par \par problem 2: poor sleep hygiene - improved\par -recommended to use sunglasses before bed \par -Good sleep hygiene was encouraged including avoiding watching television an hour before bed, keeping caffeine at a low,  avoiding reading, television, or anything, in bed, no drinking any liquids three hours before bedtime, and only getting into bed when tired and ready for sleep. \par \par problem 3: BUCK- off CPAP (resolved 2020)\par -complete home sleep study re-study \par -Recommended OTC sleep therapies such as Oxyaid/ NasalAid, Somnifix, Provent, Nozovent, Sleep Rite, Slumber Bump, Total Pillow, and AVEOTSD \par -NC w/ CPAP machine and tolerating it well (dream wear mask) ([employer]) (NC)\par - Restudy done by Israel Ponce (negative 2020)\par -Discussed the risks/associations with coronary artery disease, atrial fibrillation, arrhythmia, memory loss, issues with concentration, stroke risk, hypertension, nocturia, chronic reflux/Segovia’s esophagus some but not all inclusive. Treatment options discussed including CPAP/BiPAP machine, oral appliance, ProVent therapy, Oxy-Aid by [employer], new technologies, or positional sleep. \par \par problem 4: mild reactive airways disease (asthma)\par -no therapy indicated\par -quiet\par -Asthma is  believed to be caused by inherited (genetic) and environmental factor, but its exact cause is unknown. Asthma may be triggered by allergens, lung infections, or irritants in the air. Asthma triggers are different for each person \par \par problem 5: obesity\par -Recommend "Muniq" OTC Supplement for weight loss, energy levels, and blood sugar levels \par -complete Functional Medicine Evaluation with Dr. Walker and Dr. Wells\par - Recommended the book "Bright Spots and Land mines" by Yovani Torres\par -Weight loss, exercise, and diet control were discussed and are highly encouraged. Treatment options were given such as, aqua therapy, and contacting a nutritionist. Recommended to use the elliptical, stationary bike, less use of treadmill.  Obesity is associated with worsening asthma, shortness of breath, and potential for cardiac disease, diabetes, and other underlying medical conditions. \par \par problem 6: psychiatry\par -instructed to follow up with Dr. Ponce for Xanax\par \par Problem 7a: COVID-19 precautionary Immune Support Recommendations:\par -s/p COVID-19 6/2022\par -s/p covid 19 vaccine x3 \par -OTC Vitamin C 500mg BID \par -OTC Quercetin 250-500mg BID \par -OTC Zinc 75-100mg per day \par -OTC Melatonin 1 or 2mg a night \par -OTC Vitamin D 1-4000mg per day \par -OTC Tonic Water 8oz per day\par -Water 0.5-1 gallon per day\par \par problem 7: health maintenance \par -Recommended Seb Lester's 10-day detox diet and book. \par -s/p influenza vaccination 2022\par -recommended strep pneumonia vaccines: Prevnar-13 vaccine, followed by Pneumo vaccine 23 one year following (completed) \par -recommended early intervention for URIs\par -recommended regular osteoporosis evaluations\par -recommended early dermatological evaluations\par -recommended after the age of 50 to the age of 70, colonoscopy every 5 years \par  \par F/U in 6 months SPI\par He is encouraged to call with any changes, concerns, or questions.

## 2022-11-22 NOTE — ADDENDUM
[FreeTextEntry1] : Documented by SUNDAR Tsai acting as a scribe for Dr. Flavio De La Cruz on 11/22/2022 .\par \par All medical record entries made by the Scribe were at my, Dr. Flavio De La Cruz's, direction and personally dictated by me on 11/22/2022. I have reviewed the chart and agree that the record accurately reflects my personal performance of the history, physical exam, assessment and plan. I have also personally directed, reviewed, and agree with the discharge instructions.

## 2022-11-22 NOTE — PROCEDURE
[FreeTextEntry1] : PFT reveals normal flows, with an FEV1 of  2.83L, which is 115% of predicted, with a normal flow volume loop. \par \par FENO was 54; a normal value being less than 25\par Fractional exhaled nitric oxide (FENO) is regarded as a simple, noninvasive method for assessing eosinophilic airway inflammation. Produced by a variety of cells within the lung, nitric oxide (NO) concentrations are generally low in healthy individuals. However, high concentrations of NO appear to be involved in nonspecific host defense mechanisms and chronic inflammatory diseases such as asthma. The American Thoracic Society (ATS) therefore has recommended using FENO to aid in the diagnosis and monitoring of eosinophilic airway inflammation and asthma, and for identifying steroid responsive individuals whose chronic respiratory symptoms may be caused by airway inflammation.

## 2023-03-13 ENCOUNTER — RX RENEWAL (OUTPATIENT)
Age: 73
End: 2023-03-13

## 2023-05-23 ENCOUNTER — APPOINTMENT (OUTPATIENT)
Dept: PULMONOLOGY | Facility: CLINIC | Age: 73
End: 2023-05-23
Payer: COMMERCIAL

## 2023-05-23 VITALS
TEMPERATURE: 97.1 F | RESPIRATION RATE: 16 BRPM | OXYGEN SATURATION: 96 % | SYSTOLIC BLOOD PRESSURE: 124 MMHG | DIASTOLIC BLOOD PRESSURE: 70 MMHG | WEIGHT: 205 LBS | BODY MASS INDEX: 35 KG/M2 | HEART RATE: 70 BPM | HEIGHT: 64 IN

## 2023-05-23 PROCEDURE — 99214 OFFICE O/P EST MOD 30 MIN: CPT | Mod: 25

## 2023-05-23 PROCEDURE — 95012 NITRIC OXIDE EXP GAS DETER: CPT

## 2023-05-23 PROCEDURE — 94010 BREATHING CAPACITY TEST: CPT

## 2023-05-23 NOTE — PROCEDURE
[FreeTextEntry1] : PFT reveals normal flows, with an FEV1 of  2.87L, which is 122% of predicted, with a normal flow volume loop. \par PFTs were performed to evaluate for  asthma\par \par FENO was 54; a normal value being less than 25\par Fractional exhaled nitric oxide (FENO) is regarded as a simple, noninvasive method for assessing eosinophilic airway inflammation. Produced by a variety of cells within the lung, nitric oxide (NO) concentrations are generally low in healthy individuals. However, high concentrations of NO appear to be involved in nonspecific host defense mechanisms and chronic inflammatory diseases such as asthma. The American Thoracic Society (ATS) therefore has recommended using FENO to aid in the diagnosis and monitoring of eosinophilic airway inflammation and asthma, and for identifying steroid responsive individuals whose chronic respiratory symptoms may be caused by airway inflammation. \par

## 2023-05-23 NOTE — HISTORY OF PRESENT ILLNESS
[FreeTextEntry1] : Mr. Gusman is a 72 year old male presenting to the office today for a follow up visit for asthma, insomnia, and BUCK (prior). His chief compliant is \par \par -he notes myalgias while gardening \par -he notes bowels are regular \par -he notes NC with CPAP\par -he notes gaining weight\par -he denies exercising\par -he denies taking any new medications, vitamins, or supplements \par -he notes sleeping 6 hours when taking sleep aides\par \par -he denies any headaches, nausea, emesis, fever, chills, sweats, chest pain, chest pressure, coughing, wheezing, palpitations, constipation, diarrhea, vertigo, dysphagia, heartburn, reflux, itchy eyes, itchy ears, leg swelling, arthralgias, or sour taste in the mouth.\par

## 2023-05-23 NOTE — PHYSICAL EXAM
[No Acute Distress] : no acute distress [III] : Mallampati Class: III [Normal Oropharynx] : normal oropharynx [Normal Appearance] : normal appearance [No Neck Mass] : no neck mass [Normal Rate/Rhythm] : normal rate/rhythm [Normal S1, S2] : normal s1, s2 [No Resp Distress] : no resp distress [Clear to Auscultation Bilaterally] : clear to auscultation bilaterally [No Abnormalities] : no abnormalities [Benign] : benign [Normal Gait] : normal gait [No Clubbing] : no clubbing [No Cyanosis] : no cyanosis [No Edema] : no edema [FROM] : FROM [Normal Color/ Pigmentation] : normal color/ pigmentation [No Focal Deficits] : no focal deficits [Oriented x3] : oriented x3 [Normal Affect] : normal affect [TextBox_2] : OW [TextBox_54] : 2/6 systolic murmur [TextBox_68] : I:E ratio 1:3; clear

## 2023-05-23 NOTE — ASSESSMENT
[FreeTextEntry1] : Mr. Gusman is a 72 years old male who has a history of COVID-19 6/2022, poor sleep (prior OSAS), prediabetes, mild asthma and obesity, HTN, arthritis, stable from a pulmonary perspective. -#1 issue is lack of exercise and #2 obesity still\par \par His shortness of breath is multifactorial due to:\par -poor mechanics of breathing \par -out of shape \par -over weight \par -pulmonary disease\par   -mild asthma\par -cardiac disease\par     -systolic murmur\par \par ****forms filled out for DMV***** 11/11/2021\par \par problem 1: insomnia (I-STOP renewed)\par -continue melatonin u to 15 mg qHS\par - Continue Ambien 10 mg QHS on Weekdays \par - Continue Ambien CR 12.5 mg QHS and Xanax 0.5 mg QHS on Weekends. \par -failed Trazodone/ Silenor/ Belsomra; Try "Perrigo" \par \par \par Problem 1A: DMV forms \par -filled out 11/11/2021\par \par problem 2: poor sleep hygiene - improved\par -recommended to use sunglasses before bed \par -Good sleep hygiene was encouraged including avoiding watching television an hour before bed, keeping caffeine at a low,  avoiding reading, television, or anything, in bed, no drinking any liquids three hours before bedtime, and only getting into bed when tired and ready for sleep. \par \par problem 3: BUCK- off CPAP (resolved 2020)\par -complete home sleep study re-study \par -Recommended OTC sleep therapies such as Oxyaid/ NasalAid, Somnifix, Provent, Nozovent, Sleep Rite, Slumber Bump, Total Pillow, and AVEOTSD \par -NC w/ CPAP machine and tolerating it well (dream wear mask) ([employer]) (NC)\par - Restudy done by Hernando Ponce (negative 2020)\par -Discussed the risks/associations with coronary artery disease, atrial fibrillation, arrhythmia, memory loss, issues with concentration, stroke risk, hypertension, nocturia, chronic reflux/Segovia’s esophagus some but not all inclusive. Treatment options discussed including CPAP/BiPAP machine, oral appliance, ProVent therapy, Oxy-Aid by [employer], new technologies, or positional sleep. \par \par problem 4: mild reactive airways disease (asthma)\par -no therapy indicated\par -quiet\par -Asthma is  believed to be caused by inherited (genetic) and environmental factor, but its exact cause is unknown. Asthma may be triggered by allergens, lung infections, or irritants in the air. Asthma triggers are different for each person \par \par problem 5: obesity\par -Recommend "Muniq" OTC Supplement for weight loss, energy levels, and blood sugar levels \par -complete Functional Medicine Evaluation with Dr. Walker and Dr. Wells\par - Recommended the book "Bright Spots and Land mines" by Yovani Torres\par -Weight loss, exercise, and diet control were discussed and are highly encouraged. Treatment options were given such as, aqua therapy, and contacting a nutritionist. Recommended to use the elliptical, stationary bike, less use of treadmill.  Obesity is associated with worsening asthma, shortness of breath, and potential for cardiac disease, diabetes, and other underlying medical conditions. \par \par Problem 5A: Cardiac Disease-heart murmur\par -recommended to follow up with cardiologist (pt to ask PCP-Kris)\par \par problem 6: psychiatry\par -instructed to follow up with Dr. Ponce for Xanax\par \par Problem 7a: COVID-19 precautionary Immune Support Recommendations:\par -s/p COVID-19 6/2022\par -s/p covid 19 vaccine x3 \par -OTC Vitamin C 500mg BID \par -OTC Quercetin 250-500mg BID \par -OTC Zinc 75-100mg per day \par -OTC Melatonin 1 or 2mg a night \par -OTC Vitamin D 1-4000mg per day \par -OTC Tonic Water 8oz per day\par -Water 0.5-1 gallon per day\par \par problem 7: health maintenance \par -Recommended Seb Lester's 10-day detox diet and book. \par -s/p influenza vaccination 2022\par -recommended strep pneumonia vaccines: Prevnar-13 vaccine, followed by Pneumo vaccine 23 one year following (completed) \par -recommended early intervention for URIs\par -recommended regular osteoporosis evaluations\par -recommended early dermatological evaluations\par -recommended after the age of 50 to the age of 70, colonoscopy every 5 years \par  \par F/U in 6 months SPI\par He is encouraged to call with any changes, concerns, or questions.

## 2023-05-23 NOTE — ADDENDUM
[FreeTextEntry1] : Documented by SUNDAR Tsai acting as a scribe for Dr. Flavio De La Cruz on 05/23/2023 .\par \par All medical record entries made by the Scribe were at my, Dr. Flavio De La Cruz's, direction and personally dictated by me on 05/23/2023. I have reviewed the chart and agree that the record accurately reflects my personal performance of the history, physical exam, assessment and plan. I have also personally directed, reviewed, and agree with the discharge instructions.

## 2023-05-25 ENCOUNTER — APPOINTMENT (OUTPATIENT)
Dept: ORTHOPEDIC SURGERY | Facility: CLINIC | Age: 73
End: 2023-05-25
Payer: COMMERCIAL

## 2023-05-25 VITALS — BODY MASS INDEX: 35 KG/M2 | HEIGHT: 64 IN | WEIGHT: 205 LBS

## 2023-05-25 DIAGNOSIS — M77.8 OTHER ENTHESOPATHIES, NOT ELSEWHERE CLASSIFIED: ICD-10-CM

## 2023-05-25 PROCEDURE — 99214 OFFICE O/P EST MOD 30 MIN: CPT

## 2023-05-25 PROCEDURE — 73110 X-RAY EXAM OF WRIST: CPT | Mod: LT

## 2023-05-25 RX ORDER — MELOXICAM 15 MG/1
15 TABLET ORAL
Qty: 30 | Refills: 0 | Status: ACTIVE | COMMUNITY
Start: 2023-05-25 | End: 1900-01-01

## 2023-05-25 NOTE — PHYSICAL EXAM
[de-identified] : L wrist \par Volar tender\par Min swelling\par Stiffness\par \par Xrays Mild wrist OA; [facility] OA

## 2023-05-25 NOTE — HISTORY OF PRESENT ILLNESS
[Gradual] : gradual [6] : 6 [4] : 4 [Dull/Aching] : dull/aching [Nothing helps with pain getting better] : Nothing helps with pain getting better [de-identified] : L wrist pain \par  [] : no [FreeTextEntry1] : L hand/ wrist  [FreeTextEntry3] : 2 weeks  [FreeTextEntry5] : he has pain, no injury  [de-identified] : activity

## 2023-05-25 NOTE — ASSESSMENT
[FreeTextEntry1] : I rec MDP\par Mobic (second choice if cannot take MDP)\par Brace\par Ice\par Return prn

## 2023-09-12 ENCOUNTER — RX RENEWAL (OUTPATIENT)
Age: 73
End: 2023-09-12

## 2023-09-12 RX ORDER — ZOLPIDEM TARTRATE 12.5 MG/1
12.5 TABLET, EXTENDED RELEASE ORAL
Qty: 30 | Refills: 5 | Status: ACTIVE | COMMUNITY
Start: 2017-12-28 | End: 1900-01-01

## 2023-11-28 ENCOUNTER — APPOINTMENT (OUTPATIENT)
Dept: PULMONOLOGY | Facility: CLINIC | Age: 73
End: 2023-11-28
Payer: COMMERCIAL

## 2023-11-28 VITALS
HEIGHT: 64 IN | DIASTOLIC BLOOD PRESSURE: 64 MMHG | OXYGEN SATURATION: 96 % | TEMPERATURE: 97.2 F | BODY MASS INDEX: 33.29 KG/M2 | HEART RATE: 84 BPM | SYSTOLIC BLOOD PRESSURE: 100 MMHG | RESPIRATION RATE: 16 BRPM | WEIGHT: 195 LBS

## 2023-11-28 DIAGNOSIS — R73.03 PREDIABETES.: ICD-10-CM

## 2023-11-28 DIAGNOSIS — G47.00 INSOMNIA, UNSPECIFIED: ICD-10-CM

## 2023-11-28 PROCEDURE — 94010 BREATHING CAPACITY TEST: CPT

## 2023-11-28 PROCEDURE — 99214 OFFICE O/P EST MOD 30 MIN: CPT | Mod: 25

## 2023-11-28 PROCEDURE — 95012 NITRIC OXIDE EXP GAS DETER: CPT

## 2023-11-28 RX ORDER — METHYLPREDNISOLONE 4 MG/1
4 TABLET ORAL
Qty: 1 | Refills: 0 | Status: DISCONTINUED | COMMUNITY
Start: 2023-05-25 | End: 2023-11-28

## 2023-11-28 RX ORDER — ZOLPIDEM TARTRATE 12.5 MG/1
12.5 TABLET, EXTENDED RELEASE ORAL
Qty: 30 | Refills: 5 | Status: ACTIVE | COMMUNITY
Start: 2018-07-03 | End: 1900-01-01

## 2024-03-18 ENCOUNTER — RX RENEWAL (OUTPATIENT)
Age: 74
End: 2024-03-18

## 2024-03-18 RX ORDER — ZOLPIDEM TARTRATE 12.5 MG/1
12.5 TABLET, EXTENDED RELEASE ORAL
Qty: 30 | Refills: 5 | Status: ACTIVE | COMMUNITY
Start: 2019-09-10 | End: 1900-01-01

## 2024-05-28 ENCOUNTER — APPOINTMENT (OUTPATIENT)
Dept: PULMONOLOGY | Facility: CLINIC | Age: 74
End: 2024-05-28
Payer: COMMERCIAL

## 2024-05-28 VITALS
HEIGHT: 64 IN | RESPIRATION RATE: 16 BRPM | HEART RATE: 75 BPM | BODY MASS INDEX: 31.76 KG/M2 | SYSTOLIC BLOOD PRESSURE: 132 MMHG | OXYGEN SATURATION: 98 % | TEMPERATURE: 97.9 F | WEIGHT: 186 LBS | DIASTOLIC BLOOD PRESSURE: 74 MMHG

## 2024-05-28 DIAGNOSIS — R06.02 SHORTNESS OF BREATH: ICD-10-CM

## 2024-05-28 DIAGNOSIS — E66.9 OBESITY, UNSPECIFIED: ICD-10-CM

## 2024-05-28 DIAGNOSIS — G47.33 OBSTRUCTIVE SLEEP APNEA (ADULT) (PEDIATRIC): ICD-10-CM

## 2024-05-28 DIAGNOSIS — J45.909 UNSPECIFIED ASTHMA, UNCOMPLICATED: ICD-10-CM

## 2024-05-28 DIAGNOSIS — F41.9 ANXIETY DISORDER, UNSPECIFIED: ICD-10-CM

## 2024-05-28 PROCEDURE — 95012 NITRIC OXIDE EXP GAS DETER: CPT

## 2024-05-28 PROCEDURE — 94010 BREATHING CAPACITY TEST: CPT

## 2024-05-28 PROCEDURE — 99214 OFFICE O/P EST MOD 30 MIN: CPT | Mod: 25

## 2024-05-28 RX ORDER — ZOLPIDEM TARTRATE 10 MG/1
10 TABLET ORAL
Qty: 30 | Refills: 5 | Status: ACTIVE | COMMUNITY
Start: 2018-07-03 | End: 1900-01-01

## 2024-05-28 RX ORDER — ESZOPICLONE 3 MG/1
3 TABLET, FILM COATED ORAL
Qty: 30 | Refills: 5 | Status: DISCONTINUED | COMMUNITY
Start: 2023-11-28 | End: 2024-05-28

## 2024-05-28 NOTE — ASSESSMENT
[FreeTextEntry1] : Mr. Gusman is a 73 year old male who has a history of COVID-19 6/2022, poor sleep (prior OSAS), diabetes mellitus, mild asthma and obesity, HTN, arthritis, stable from a pulmonary perspective. -#1 issues DM/obesity/poor sleep (initiation/maintenance)- improved  His shortness of breath is multifactorial due to: -poor mechanics of breathing -out of shape -overweight -pulmonary disease  -mild asthma -cardiac disease  -systolic murmur  ****forms filled out for DMV***** 11/11/2021  problem 1: insomnia (I-STOP renewed) -continue melatonin up to 15 mg qHS -Continue Ambien CR 12.5 mg QHS and Xanax 0.5 mg QHS -s/p Lunesta 3 mg QHS (off) -failed Trazodone/ Silenor/ Belsomra; Try "Perrigo"  Problem 1A: DMV forms -filled out 11/11/2021  problem 2: poor sleep hygiene - improved -recommended to use sunglasses before bed -Good sleep hygiene was encouraged including avoiding watching television an hour before bed, keeping caffeine at a low, avoiding reading, television, or anything, in bed, no drinking any liquids three hours before bedtime, and only getting into bed when tired and ready for sleep.  problem 3: BUCK- off CPAP (resolved 2020) -Recommended OTC sleep therapies such as Oxyaid/ NasalAid, Somnifix, Provent, Nozovent, Sleep Rite, Slumber Bump, Total Pillow, and AVEOTSD -NC w/ CPAP machine and tolerating it well (dream wear mask) ([employer]) (NC) - Restudy done by Hernando Ponce (negative 2020) -Discussed the risks/associations with coronary artery disease, atrial fibrillation, arrhythmia, memory loss, issues with concentration, stroke risk, hypertension, nocturia, chronic reflux/Segovia's esophagus some but not all inclusive. Treatment options discussed including CPAP/BiPAP machine, oral appliance, ProVent therapy, Oxy-Aid by [employer], new technologies, or positional sleep.  problem 4: mild reactive airways disease (asthma)- quiet -no therapy indicated at this time -quiet -Asthma is believed to be caused by inherited (genetic) and environmental factor, but its exact cause is unknown. Asthma may be triggered by allergens, lung infections, or irritants in the air. Asthma triggers are different for each person  problem 5: obesity- (20 lbs down 5/2024) -s/p Trulicity, on Ozempic- moving to [facility] 6/2024 (Dr. Mcwilliams) -recommended Berberine OTC supplement for visceral fat loss  -complete Functional Medicine Evaluation with Dr. Wells - Recommended the book "Bright Spots and Land mines" by Yovani Torres -Weight loss, exercise, and diet control were discussed and are highly encouraged. Treatment options were given such as, aqua therapy, and contacting a nutritionist. Recommended to use the elliptical, stationary bike, less use of treadmill. Obesity is associated with worsening asthma, shortness of breath, and potential for cardiac disease, diabetes, and other underlying medical conditions.  Problem 5A: Cardiac Disease-heart murmur -recommended to follow up with cardiologist (pt to ask PCP-Kris)  problem 6: psychiatry -instructed to follow up with Dr. Pocne for Xanax  Problem 7a: COVID-19 precautionary Immune Support Recommendations: -s/p COVID-19 6/2022 -s/p covid 19 vaccine x3 -OTC Vitamin C 500mg BID -OTC Quercetin 250-500mg BID -OTC Zinc 75-100mg per day -OTC Melatonin 1 or 2mg a night -OTC Vitamin D 1-4000mg per day -OTC Tonic Water 8oz per day -Water 0.5-1 gallon per day  problem 7: health maintenance -s/p influenza vaccination 2023 -recommended RSV vaccine Fall 2024 -recommended strep pneumonia vaccines: Prevnar-13 vaccine, followed by Pneumo vaccine 23 one year following (completed) -recommended early intervention for URIs -recommended regular osteoporosis evaluations -recommended early dermatological evaluations -recommended after the age of 50 to the age of 70, colonoscopy every 5 years  F/P in 6 months  He is encouraged to call with any changes, concerns, or questions.

## 2024-05-28 NOTE — PROCEDURE
[FreeTextEntry1] : PFTs revealed normal flows; FEV1 was 2.25L, which is 93.2% of predicted; normal flow volume loop. PFTs were performed to evaluate for asthma  FENO was 42; a normal value being less than 25 Fractional exhaled nitric oxide (FENO) is regarded as a simple, noninvasive method for assessing eosinophilic airway inflammation. Produced by a variety of cells within the lung, nitric oxide (NO) concentrations are generally low in healthy individuals. However, high concentrations of NO appear to be involved in nonspecific host defense mechanisms and chronic inflammatory diseases such as asthma. The American Thoracic Society (ATS) therefore has recommended using FENO to aid in the diagnosis and monitoring of eosinophilic airway inflammation and asthma, and for identifying steroid responsive individuals whose chronic respiratory symptoms may be caused by airway inflammation.

## 2024-05-28 NOTE — HISTORY OF PRESENT ILLNESS
[FreeTextEntry1] : Mr. Gusman is a 73 year old male presenting to the office today for a follow-up pulmonary evaluation for asthma, insomnia, and BUCK (prior). His chief compliant is   -he notes he lost 20 lbs on Ozempic. He's moving back to [facility] -he notes his HgbA1C is now ~6.0, down from over 7.0 prior -he notes exercising, though less than he was a few months ago. He bicycles and walks with limitations from L-sided sciatica. He needs to stop while walking to stretch -he notes his L-sided sciatica is increasing his discomfort while sleeping. He hasn't yet gotten an epidural -he denies palpitations -he notes Ambien doesn't give him exhaustion in the morning -he notes Lunesta leaves him groggy in the morning. He's no longer using it -he notes taking supplemental Mg -he notes he no longer uses his CPAP -he denies snoring  -he denies any headaches, nausea, emesis, fever, chills, sweats, chest pain, chest pressure, coughing, wheezing, palpitations, diarrhea, constipation, dysphagia, vertigo, arthralgias, myalgias, leg swelling, itchy eyes, itchy ears, heartburn, reflux, or sour taste in the mouth.

## 2024-05-28 NOTE — PHYSICAL EXAM
[No Acute Distress] : no acute distress [Normal Oropharynx] : normal oropharynx [III] : Mallampati Class: III [Normal Appearance] : normal appearance [No Neck Mass] : no neck mass [Normal Rate/Rhythm] : normal rate/rhythm [Normal S1, S2] : normal s1, s2 [No Resp Distress] : no resp distress [Clear to Auscultation Bilaterally] : clear to auscultation bilaterally [No Abnormalities] : no abnormalities [Benign] : benign [Normal Gait] : normal gait [No Clubbing] : no clubbing [No Cyanosis] : no cyanosis [No Edema] : no edema [FROM] : FROM [Normal Color/ Pigmentation] : normal color/ pigmentation [No Focal Deficits] : no focal deficits [Oriented x3] : oriented x3 [Normal Affect] : normal affect [TextBox_2] : OW [TextBox_54] : 2/6 systolic murmur [TextBox_68] : I:E ratio 1:3; clear

## 2024-05-28 NOTE — ADDENDUM
[FreeTextEntry1] : Documented by April Paez acting as a scribe for Dr. Flavio De La Cruz on 05/28/2024. All medical record entries made by the Scribe were at my, Dr. Flavio De La Cruz's, direction and personally dictated by me on 05/28/2024. I have reviewed the chart and agree that the record accurately reflects my personal performance of the history, physical exam, assessment and plan. I have also personally directed, reviewed, and agree with the discharge instructions.

## 2024-08-30 ENCOUNTER — RX RENEWAL (OUTPATIENT)
Age: 74
End: 2024-08-30

## 2024-10-31 ENCOUNTER — RX RENEWAL (OUTPATIENT)
Age: 74
End: 2024-10-31

## 2024-11-26 ENCOUNTER — APPOINTMENT (OUTPATIENT)
Dept: PULMONOLOGY | Facility: CLINIC | Age: 74
End: 2024-11-26
Payer: COMMERCIAL

## 2024-11-26 VITALS
SYSTOLIC BLOOD PRESSURE: 136 MMHG | WEIGHT: 190 LBS | OXYGEN SATURATION: 97 % | HEIGHT: 64 IN | HEART RATE: 94 BPM | TEMPERATURE: 97.7 F | RESPIRATION RATE: 16 BRPM | DIASTOLIC BLOOD PRESSURE: 86 MMHG | BODY MASS INDEX: 32.44 KG/M2

## 2024-11-26 DIAGNOSIS — F41.9 ANXIETY DISORDER, UNSPECIFIED: ICD-10-CM

## 2024-11-26 DIAGNOSIS — R06.02 SHORTNESS OF BREATH: ICD-10-CM

## 2024-11-26 DIAGNOSIS — Z11.59 ENCOUNTER FOR SCREENING FOR OTHER VIRAL DISEASES: ICD-10-CM

## 2024-11-26 DIAGNOSIS — Z71.89 OTHER SPECIFIED COUNSELING: ICD-10-CM

## 2024-11-26 DIAGNOSIS — J45.909 UNSPECIFIED ASTHMA, UNCOMPLICATED: ICD-10-CM

## 2024-11-26 DIAGNOSIS — G47.33 OBSTRUCTIVE SLEEP APNEA (ADULT) (PEDIATRIC): ICD-10-CM

## 2024-11-26 DIAGNOSIS — U07.1 COVID-19: ICD-10-CM

## 2024-11-26 DIAGNOSIS — E66.9 OBESITY, UNSPECIFIED: ICD-10-CM

## 2024-11-26 PROCEDURE — 99214 OFFICE O/P EST MOD 30 MIN: CPT | Mod: 25

## 2024-11-26 PROCEDURE — 94010 BREATHING CAPACITY TEST: CPT

## 2024-11-26 PROCEDURE — 95012 NITRIC OXIDE EXP GAS DETER: CPT

## 2024-11-26 RX ORDER — DARIDOREXANT 50 MG/1
50 TABLET, FILM COATED ORAL
Qty: 30 | Refills: 5 | Status: ACTIVE | COMMUNITY
Start: 2024-11-26 | End: 1900-01-01

## 2025-05-19 ENCOUNTER — NON-APPOINTMENT (OUTPATIENT)
Age: 75
End: 2025-05-19

## 2025-05-20 ENCOUNTER — APPOINTMENT (OUTPATIENT)
Dept: PULMONOLOGY | Facility: CLINIC | Age: 75
End: 2025-05-20
Payer: COMMERCIAL

## 2025-05-20 VITALS
OXYGEN SATURATION: 97 % | BODY MASS INDEX: 34.31 KG/M2 | RESPIRATION RATE: 16 BRPM | DIASTOLIC BLOOD PRESSURE: 78 MMHG | HEIGHT: 64 IN | WEIGHT: 201 LBS | SYSTOLIC BLOOD PRESSURE: 124 MMHG | HEART RATE: 84 BPM | TEMPERATURE: 97.3 F

## 2025-05-20 DIAGNOSIS — J45.909 UNSPECIFIED ASTHMA, UNCOMPLICATED: ICD-10-CM

## 2025-05-20 DIAGNOSIS — F41.9 ANXIETY DISORDER, UNSPECIFIED: ICD-10-CM

## 2025-05-20 DIAGNOSIS — G47.33 OBSTRUCTIVE SLEEP APNEA (ADULT) (PEDIATRIC): ICD-10-CM

## 2025-05-20 DIAGNOSIS — E66.9 OBESITY, UNSPECIFIED: ICD-10-CM

## 2025-05-20 DIAGNOSIS — R06.02 SHORTNESS OF BREATH: ICD-10-CM

## 2025-05-20 PROCEDURE — 94727 GAS DIL/WSHOT DETER LNG VOL: CPT

## 2025-05-20 PROCEDURE — 99214 OFFICE O/P EST MOD 30 MIN: CPT | Mod: 25

## 2025-05-20 PROCEDURE — 94010 BREATHING CAPACITY TEST: CPT

## 2025-05-20 PROCEDURE — 94729 DIFFUSING CAPACITY: CPT

## 2025-05-20 PROCEDURE — 95012 NITRIC OXIDE EXP GAS DETER: CPT

## 2025-05-20 PROCEDURE — ZZZZZ: CPT

## 2025-05-20 RX ORDER — GABAPENTIN 100 MG/1
100 CAPSULE ORAL 3 TIMES DAILY
Qty: 270 | Refills: 1 | Status: ACTIVE | COMMUNITY
Start: 2025-05-20 | End: 1900-01-01

## 2025-08-25 ENCOUNTER — RX RENEWAL (OUTPATIENT)
Age: 75
End: 2025-08-25